# Patient Record
Sex: MALE | Race: WHITE | NOT HISPANIC OR LATINO
[De-identification: names, ages, dates, MRNs, and addresses within clinical notes are randomized per-mention and may not be internally consistent; named-entity substitution may affect disease eponyms.]

---

## 2017-04-17 ENCOUNTER — APPOINTMENT (OUTPATIENT)
Dept: SURGERY | Facility: CLINIC | Age: 62
End: 2017-04-17
Payer: MEDICARE

## 2017-04-17 VITALS
OXYGEN SATURATION: 99 % | RESPIRATION RATE: 15 BRPM | TEMPERATURE: 97.6 F | WEIGHT: 150 LBS | DIASTOLIC BLOOD PRESSURE: 91 MMHG | BODY MASS INDEX: 23.54 KG/M2 | SYSTOLIC BLOOD PRESSURE: 138 MMHG | HEART RATE: 61 BPM | HEIGHT: 67 IN

## 2017-04-17 DIAGNOSIS — R10.84 GENERALIZED ABDOMINAL PAIN: ICD-10-CM

## 2017-04-17 DIAGNOSIS — K56.1 INTUSSUSCEPTION: ICD-10-CM

## 2017-04-17 DIAGNOSIS — R10.12 LEFT UPPER QUADRANT PAIN: ICD-10-CM

## 2017-04-17 DIAGNOSIS — Z87.39 PERSONAL HISTORY OF OTHER DISEASES OF THE MUSCULOSKELETAL SYSTEM AND CONNECTIVE TISSUE: ICD-10-CM

## 2017-04-17 PROCEDURE — 99204 OFFICE O/P NEW MOD 45 MIN: CPT

## 2017-04-17 RX ORDER — CIPROFLOXACIN HYDROCHLORIDE 500 MG/1
500 TABLET, FILM COATED ORAL
Qty: 20 | Refills: 0 | Status: DISCONTINUED | COMMUNITY
Start: 2017-03-31

## 2017-04-17 RX ORDER — SUCRALFATE 1 G/10ML
1 SUSPENSION ORAL
Qty: 300 | Refills: 0 | Status: DISCONTINUED | COMMUNITY
Start: 2016-11-01

## 2017-04-17 RX ORDER — MESALAMINE 500 MG/1
500 CAPSULE ORAL
Qty: 240 | Refills: 0 | Status: DISCONTINUED | COMMUNITY
Start: 2017-03-21

## 2017-04-17 RX ORDER — OMEPRAZOLE 20 MG/1
20 CAPSULE, DELAYED RELEASE ORAL
Qty: 90 | Refills: 0 | Status: DISCONTINUED | COMMUNITY
Start: 2017-01-25

## 2017-04-17 RX ORDER — ONDANSETRON 8 MG/1
8 TABLET, ORALLY DISINTEGRATING ORAL
Qty: 20 | Refills: 0 | Status: DISCONTINUED | COMMUNITY
Start: 2016-12-02

## 2017-04-17 RX ORDER — ATORVASTATIN CALCIUM 10 MG/1
10 TABLET, FILM COATED ORAL
Qty: 30 | Refills: 0 | Status: DISCONTINUED | COMMUNITY
Start: 2017-02-13 | End: 2017-04-17

## 2018-10-30 ENCOUNTER — APPOINTMENT (OUTPATIENT)
Dept: SPINE | Facility: CLINIC | Age: 63
End: 2018-10-30
Payer: MEDICARE

## 2018-10-30 VITALS
DIASTOLIC BLOOD PRESSURE: 87 MMHG | HEIGHT: 67 IN | BODY MASS INDEX: 23.54 KG/M2 | HEART RATE: 49 BPM | SYSTOLIC BLOOD PRESSURE: 144 MMHG | WEIGHT: 150 LBS

## 2018-10-30 DIAGNOSIS — M50.20 OTHER CERVICAL DISC DISPLACEMENT, UNSPECIFIED CERVICAL REGION: ICD-10-CM

## 2018-10-30 DIAGNOSIS — Z56.0 UNEMPLOYMENT, UNSPECIFIED: ICD-10-CM

## 2018-10-30 PROCEDURE — 99203 OFFICE O/P NEW LOW 30 MIN: CPT

## 2018-10-30 SDOH — ECONOMIC STABILITY - INCOME SECURITY: UNEMPLOYMENT, UNSPECIFIED: Z56.0

## 2018-10-31 ENCOUNTER — APPOINTMENT (OUTPATIENT)
Dept: RADIOLOGY | Facility: CLINIC | Age: 63
End: 2018-10-31

## 2018-10-31 ENCOUNTER — OUTPATIENT (OUTPATIENT)
Dept: OUTPATIENT SERVICES | Facility: HOSPITAL | Age: 63
LOS: 1 days | End: 2018-10-31
Payer: MEDICARE

## 2018-10-31 ENCOUNTER — APPOINTMENT (OUTPATIENT)
Dept: CT IMAGING | Facility: CLINIC | Age: 63
End: 2018-10-31

## 2018-10-31 DIAGNOSIS — M54.12 RADICULOPATHY, CERVICAL REGION: ICD-10-CM

## 2018-10-31 PROCEDURE — 72125 CT NECK SPINE W/O DYE: CPT

## 2018-10-31 PROCEDURE — 76376 3D RENDER W/INTRP POSTPROCES: CPT | Mod: 26

## 2018-10-31 PROCEDURE — 76376 3D RENDER W/INTRP POSTPROCES: CPT

## 2018-10-31 PROCEDURE — 72082 X-RAY EXAM ENTIRE SPI 2/3 VW: CPT

## 2018-10-31 PROCEDURE — 72125 CT NECK SPINE W/O DYE: CPT | Mod: 26

## 2018-10-31 PROCEDURE — 72082 X-RAY EXAM ENTIRE SPI 2/3 VW: CPT | Mod: 26

## 2018-11-14 ENCOUNTER — APPOINTMENT (OUTPATIENT)
Dept: SPINE | Facility: CLINIC | Age: 63
End: 2018-11-14
Payer: MEDICARE

## 2018-11-14 VITALS
WEIGHT: 150 LBS | SYSTOLIC BLOOD PRESSURE: 110 MMHG | DIASTOLIC BLOOD PRESSURE: 78 MMHG | BODY MASS INDEX: 23.54 KG/M2 | HEIGHT: 67 IN

## 2018-11-14 PROCEDURE — 99212 OFFICE O/P EST SF 10 MIN: CPT

## 2018-11-16 ENCOUNTER — OUTPATIENT (OUTPATIENT)
Dept: OUTPATIENT SERVICES | Facility: HOSPITAL | Age: 63
LOS: 1 days | End: 2018-11-16
Payer: MEDICARE

## 2018-11-16 ENCOUNTER — APPOINTMENT (OUTPATIENT)
Dept: MRI IMAGING | Facility: CLINIC | Age: 63
End: 2018-11-16
Payer: MEDICARE

## 2018-11-16 DIAGNOSIS — Z00.8 ENCOUNTER FOR OTHER GENERAL EXAMINATION: ICD-10-CM

## 2018-11-16 PROCEDURE — 72141 MRI NECK SPINE W/O DYE: CPT | Mod: 26

## 2018-11-16 PROCEDURE — 72141 MRI NECK SPINE W/O DYE: CPT

## 2018-11-27 ENCOUNTER — APPOINTMENT (OUTPATIENT)
Dept: SPINE | Facility: CLINIC | Age: 63
End: 2018-11-27
Payer: MEDICARE

## 2018-11-27 VITALS
SYSTOLIC BLOOD PRESSURE: 146 MMHG | HEART RATE: 46 BPM | DIASTOLIC BLOOD PRESSURE: 85 MMHG | BODY MASS INDEX: 23.54 KG/M2 | WEIGHT: 150 LBS | HEIGHT: 67 IN

## 2018-11-27 DIAGNOSIS — Z78.9 OTHER SPECIFIED HEALTH STATUS: ICD-10-CM

## 2018-11-27 DIAGNOSIS — M54.12 RADICULOPATHY, CERVICAL REGION: ICD-10-CM

## 2018-11-27 PROCEDURE — 99214 OFFICE O/P EST MOD 30 MIN: CPT

## 2018-11-30 PROBLEM — Z78.9 SOCIAL ALCOHOL USE: Status: ACTIVE | Noted: 2018-10-30

## 2019-06-10 ENCOUNTER — TRANSCRIPTION ENCOUNTER (OUTPATIENT)
Age: 64
End: 2019-06-10

## 2019-07-09 ENCOUNTER — NON-APPOINTMENT (OUTPATIENT)
Age: 64
End: 2019-07-09

## 2019-07-09 ENCOUNTER — APPOINTMENT (OUTPATIENT)
Dept: CARDIOLOGY | Facility: CLINIC | Age: 64
End: 2019-07-09
Payer: MEDICARE

## 2019-07-09 VITALS
HEIGHT: 67 IN | OXYGEN SATURATION: 97 % | SYSTOLIC BLOOD PRESSURE: 120 MMHG | WEIGHT: 146 LBS | DIASTOLIC BLOOD PRESSURE: 76 MMHG | BODY MASS INDEX: 22.91 KG/M2 | HEART RATE: 55 BPM

## 2019-07-09 DIAGNOSIS — I49.1 ATRIAL PREMATURE DEPOLARIZATION: ICD-10-CM

## 2019-07-09 PROCEDURE — 99204 OFFICE O/P NEW MOD 45 MIN: CPT | Mod: 25

## 2019-07-09 PROCEDURE — 93000 ELECTROCARDIOGRAM COMPLETE: CPT

## 2019-07-09 RX ORDER — ATORVASTATIN CALCIUM 80 MG/1
TABLET, FILM COATED ORAL
Refills: 0 | Status: ACTIVE | COMMUNITY

## 2019-07-09 RX ORDER — OMEPRAZOLE 20 MG/1
20 TABLET, DELAYED RELEASE ORAL
Refills: 0 | Status: ACTIVE | COMMUNITY

## 2019-07-09 NOTE — REVIEW OF SYSTEMS
[Negative] : Heme/Lymph [Recent Weight Loss (___ Lbs)] : recent [unfilled] ~Ulb weight loss [see HPI] : see HPI [Abdominal Pain] : abdominal pain [FreeTextEntry3] : hay fever [Under Stress] : under stress [FreeTextEntry2] : back pain

## 2019-07-09 NOTE — HISTORY OF PRESENT ILLNESS
[FreeTextEntry1] : Kenny Sánchez is a 63-year-old man with a history of Slime-Parkinson-White status post remote ablation, Mccoy's esophagus, premature atrial complexes, and cervical spine disease  who returns for cardiac examination after a long absence - our last encounter was more than 3 years ago.  He has been doing well from a cardiac standpoint but was recently hospitalized at [facility] (mid June 2019) with appendicitis complicated by rupture and treated with a course of antibiotics with plans for eventual operative intervention.  He has been taking Avelox and is concerned about potential side effects -- specifically, tendon rupture and risk of aneurysm formation.  He recently bent over a railing on his deck resulting in exacerbation of his abdominal discomfort -- he is worried about "injuring" his abdominal aorta and requests imaging.  He denies angina, dyspnea, palpitations; baseline functional status is good.\par

## 2019-07-09 NOTE — DISCUSSION/SUMMARY
[With Me] : with me [FreeTextEntry1] : \par Abdominal pain: Likely related to recent appendicitis with rupture and reported small abscess formation; injury to the abdominal aorta seems unlikely -- patient will return for ultrasound imaging of the abdominal aorta.\par \par History of Slime-Parkinson-White syndrome: Status post ablation; no recent arrhythmia\par \par Premature atrial complexes: Chronic; no treatment indicated at the present time. [___ Year(s)] : [unfilled] year(s)

## 2019-07-09 NOTE — PHYSICAL EXAM
[Normal Appearance] : normal appearance [General Appearance - In No Acute Distress] : no acute distress [Well Groomed] : well groomed [Eyelids - No Xanthelasma] : the eyelids demonstrated no xanthelasmas [No Oral Cyanosis] : no oral cyanosis [No Oral Pallor] : no oral pallor [Heart Rate And Rhythm] : heart rate and rhythm were normal [Murmurs] : no murmurs present [Heart Sounds] : normal S1 and S2 [Respiration, Rhythm And Depth] : normal respiratory rhythm and effort [Bowel Sounds] : normal bowel sounds [Auscultation Breath Sounds / Voice Sounds] : lungs were clear to auscultation bilaterally [Abdomen Tenderness] : non-tender [Abdomen Soft] : soft [Abnormal Walk] : normal gait [Nail Clubbing] : no clubbing of the fingernails [Cyanosis, Localized] : no localized cyanosis [Skin Color & Pigmentation] : normal skin color and pigmentation [Oriented To Time, Place, And Person] : oriented to person, place, and time [] : no rash [Impaired Insight] : insight and judgment were intact [Affect] : the affect was normal [Mood] : the mood was normal [Abdomen Mass (___ Cm)] : no abdominal mass palpated

## 2019-07-19 ENCOUNTER — APPOINTMENT (OUTPATIENT)
Dept: CARDIOLOGY | Facility: CLINIC | Age: 64
End: 2019-07-19
Payer: MEDICARE

## 2019-07-19 PROCEDURE — 93978 VASCULAR STUDY: CPT

## 2019-07-24 ENCOUNTER — APPOINTMENT (OUTPATIENT)
Dept: INTERNAL MEDICINE | Facility: CLINIC | Age: 64
End: 2019-07-24
Payer: MEDICARE

## 2019-07-24 VITALS
BODY MASS INDEX: 22.76 KG/M2 | SYSTOLIC BLOOD PRESSURE: 124 MMHG | DIASTOLIC BLOOD PRESSURE: 72 MMHG | WEIGHT: 145 LBS | HEIGHT: 67 IN | RESPIRATION RATE: 18 BRPM | TEMPERATURE: 97.9 F | OXYGEN SATURATION: 97 % | HEART RATE: 65 BPM

## 2019-07-24 DIAGNOSIS — R50.9 FEVER, UNSPECIFIED: ICD-10-CM

## 2019-07-24 DIAGNOSIS — R10.9 UNSPECIFIED ABDOMINAL PAIN: ICD-10-CM

## 2019-07-24 PROCEDURE — 99204 OFFICE O/P NEW MOD 45 MIN: CPT

## 2019-07-24 NOTE — HISTORY OF PRESENT ILLNESS
[FreeTextEntry8] : abd pain.\par \par This is a 63-year-old man was admitted to [facility] in June for appendicitis with rupture. He has been treated with Avelox and took his last dose yesterday. He comes in today with a four-day history of fever up to 102.7, 102 last night, and epigastric persistent stabbing pain radiating out laterally, bilaterally. He is having nausea and a very decreased appetite. No vomiting. No diarrhea or blood per rectum.\par \par He is not having cough or sputum production.

## 2019-07-24 NOTE — ASSESSMENT
[FreeTextEntry1] : #1 4 day history of fever, up to 102.7 and persistent stabbing epigastric pain. Patient was sent directly to the emergency room. His following surgeons Dr. Francsi and Dr Butterfield are at [place] and he will go there.  I s/w ER there.\par \par #2 history of appendicitis with rupture in mid June, 2019.\par \par #3 history of WPW, status post ablation.\par \par #4 hyperlipidemia.\par \par #5 history of Mccoy's esophagus.

## 2019-07-24 NOTE — PHYSICAL EXAM
[No Acute Distress] : no acute distress [Well Nourished] : well nourished [Well Developed] : well developed [Normal Sclera/Conjunctiva] : normal sclera/conjunctiva [Normal Outer Ear/Nose] : the outer ears and nose were normal in appearance [Normal Oropharynx] : the oropharynx was normal [No JVD] : no jugular venous distention [No Lymphadenopathy] : no lymphadenopathy [Supple] : supple [No Respiratory Distress] : no respiratory distress  [No Accessory Muscle Use] : no accessory muscle use [Clear to Auscultation] : lungs were clear to auscultation bilaterally [Normal Rate] : normal rate  [Normal S1, S2] : normal S1 and S2 [No Edema] : there was no peripheral edema [No Extremity Clubbing/Cyanosis] : no extremity clubbing/cyanosis [Soft] : abdomen soft [Non-distended] : non-distended [No HSM] : no HSM [Normal Bowel Sounds] : normal bowel sounds [Normal Anterior Cervical Nodes] : no anterior cervical lymphadenopathy [No Rash] : no rash [Normal Gait] : normal gait [No Focal Deficits] : no focal deficits [Normal Affect] : the affect was normal [Normal Insight/Judgement] : insight and judgment were intact [de-identified] : tender to deep palp epig, LUQ and RLQ [de-identified] : HR 64

## 2019-07-24 NOTE — REVIEW OF SYSTEMS
[Fever] : fever [Fatigue] : fatigue [Chills] : chills [Cough] : no cough [FreeTextEntry7] : see above

## 2019-08-13 ENCOUNTER — APPOINTMENT (OUTPATIENT)
Dept: COLORECTAL SURGERY | Facility: CLINIC | Age: 64
End: 2019-08-13
Payer: MEDICARE

## 2019-08-13 VITALS
BODY MASS INDEX: 22.76 KG/M2 | HEART RATE: 72 BPM | DIASTOLIC BLOOD PRESSURE: 86 MMHG | WEIGHT: 145 LBS | TEMPERATURE: 98.1 F | HEIGHT: 67 IN | SYSTOLIC BLOOD PRESSURE: 129 MMHG

## 2019-08-13 DIAGNOSIS — K35.32 ACUTE APPENDICITIS W/ PERFORATION AND LOCALIZED PERITONITIS, W/O ABSCESS: ICD-10-CM

## 2019-08-13 PROCEDURE — 99215 OFFICE O/P EST HI 40 MIN: CPT

## 2019-08-13 NOTE — ASSESSMENT
[FreeTextEntry1] : 63-year-old male with a recent laparoscopic appendix removal possibility of spilled mucinous material. Recommend consultation with surgical oncology for possibility of HIPCT

## 2019-08-13 NOTE — PHYSICAL EXAM
[Abdomen Masses] : No abdominal masses [Abdomen Tenderness] : ~T No ~M abdominal tenderness [Tender] : nontender [JVD] : no jugular venous distention  [Normal Breath Sounds] : Normal breath sounds [Normal Heart Sounds] : normal heart sounds [Normal Rate and Rhythm] : normal rate and rhythm [No Rash or Lesion] : No rash or lesion [Alert] : alert [Oriented to Person] : oriented to person [Oriented to Place] : oriented to place [Oriented to Time] : oriented to time [Calm] : calm [de-identified] : Looks well in no distress, of stated age. [de-identified] : pupils equal reactive to light normocephalic atraumatic. [de-identified] : moves all 4 extremities appropriately with 5 over 5 strength

## 2019-08-13 NOTE — HISTORY OF PRESENT ILLNESS
[FreeTextEntry1] : 63-year-old male recently underwent laparoscopic resection of perforated appendicitis. Was told that there was mucinous lesion spilled during the operation and possibility of future recurrence

## 2019-08-21 ENCOUNTER — APPOINTMENT (OUTPATIENT)
Dept: INTERNAL MEDICINE | Facility: CLINIC | Age: 64
End: 2019-08-21

## 2020-03-12 ENCOUNTER — APPOINTMENT (OUTPATIENT)
Dept: DERMATOLOGY | Facility: CLINIC | Age: 65
End: 2020-03-12
Payer: MEDICARE

## 2020-03-12 ENCOUNTER — APPOINTMENT (OUTPATIENT)
Dept: DERMATOLOGY | Facility: CLINIC | Age: 65
End: 2020-03-12
Payer: SELF-PAY

## 2020-03-12 DIAGNOSIS — Z00.00 ENCOUNTER FOR GENERAL ADULT MEDICAL EXAMINATION W/OUT ABNORMAL FINDINGS: ICD-10-CM

## 2020-03-12 DIAGNOSIS — L21.9 SEBORRHEIC DERMATITIS, UNSPECIFIED: ICD-10-CM

## 2020-03-12 PROCEDURE — 99203 OFFICE O/P NEW LOW 30 MIN: CPT | Mod: 25

## 2020-03-12 PROCEDURE — 17000 DESTRUCT PREMALG LESION: CPT | Mod: 59

## 2020-03-12 PROCEDURE — 0029D: CPT

## 2020-03-12 PROCEDURE — 17110 DESTRUCTION B9 LES UP TO 14: CPT

## 2020-03-12 PROCEDURE — 11102 TANGNTL BX SKIN SINGLE LES: CPT | Mod: 59

## 2020-03-12 NOTE — HISTORY OF PRESENT ILLNESS
[FreeTextEntry1] : Patient presents for skin examination. [de-identified] : Notes itching and irritated lesion of the left flank.  Present for many months, exacerbating/remitting, especially after biking in tight biking shirt.

## 2020-03-12 NOTE — PHYSICAL EXAM
[Alert] : alert [Oriented x 3] : ~L oriented x 3 [Well Nourished] : well nourished [Full Body Skin Exam Performed] : performed [FreeTextEntry3] : A full skin exam was performed including the scalp, face (including lips, ears, nose and eyes), neck, chest, abdomen, back, buttocks, upper extremities and lower extremities.  The patient declined examination of the genitalia.  \par The exam revealed the following benign growths:\par [place] pigmented nevi.\par Seborrheic keratoses.\par Lentigines.\par \par Keratotic papule, right forehead.\par \par Greasy tan papules with erythema, right pre-auricular region and left flank.\par \par Mild erythema and scalp, bilateral suprabrows.\par \par Hyperpigmented macule with hyperpigmented speck at the right lateral pole, on the xyphoid process.

## 2020-03-12 NOTE — ASSESSMENT
[FreeTextEntry1] : A complete skin examination was performed.  We discussed the importance of photoprotection, including the use of hats, protective clothing and sunscreens with an SPF of at least 30.  Sun avoidance was also discussed.  The ABCDE's of melanoma was discussed with the patient.  Regular skin exams are encouraged.\par \par Seb derm\par hytone OTC bid prn.\par \par R/O melanoma of the xyphoid process.\par Will call patient with bx results.

## 2020-03-23 LAB — CORE LAB BIOPSY: NORMAL

## 2020-06-20 ENCOUNTER — APPOINTMENT (OUTPATIENT)
Dept: DERMATOLOGY | Facility: CLINIC | Age: 65
End: 2020-06-20
Payer: MEDICARE

## 2020-06-20 VITALS — WEIGHT: 150 LBS | BODY MASS INDEX: 23.49 KG/M2

## 2020-06-20 DIAGNOSIS — L85.3 XEROSIS CUTIS: ICD-10-CM

## 2020-06-20 DIAGNOSIS — D48.5 NEOPLASM OF UNCERTAIN BEHAVIOR OF SKIN: ICD-10-CM

## 2020-06-20 DIAGNOSIS — L82.1 OTHER SEBORRHEIC KERATOSIS: ICD-10-CM

## 2020-06-20 PROCEDURE — 99213 OFFICE O/P EST LOW 20 MIN: CPT | Mod: 25

## 2020-06-20 PROCEDURE — 11102 TANGNTL BX SKIN SINGLE LES: CPT | Mod: 59

## 2020-06-20 PROCEDURE — 17110 DESTRUCTION B9 LES UP TO 14: CPT | Mod: 59

## 2020-06-26 LAB — CORE LAB BIOPSY: NORMAL

## 2020-08-18 ENCOUNTER — APPOINTMENT (OUTPATIENT)
Dept: DERMATOLOGY | Facility: CLINIC | Age: 65
End: 2020-08-18
Payer: MEDICARE

## 2020-08-18 VITALS — HEIGHT: 67 IN | WEIGHT: 150 LBS | BODY MASS INDEX: 23.54 KG/M2

## 2020-08-18 DIAGNOSIS — L60.3 NAIL DYSTROPHY: ICD-10-CM

## 2020-08-18 PROCEDURE — 99213 OFFICE O/P EST LOW 20 MIN: CPT

## 2021-09-13 ENCOUNTER — NON-APPOINTMENT (OUTPATIENT)
Age: 66
End: 2021-09-13

## 2021-09-14 ENCOUNTER — APPOINTMENT (OUTPATIENT)
Dept: COLORECTAL SURGERY | Facility: CLINIC | Age: 66
End: 2021-09-14
Payer: MEDICARE

## 2021-09-14 PROCEDURE — 99214 OFFICE O/P EST MOD 30 MIN: CPT | Mod: 25

## 2021-09-14 PROCEDURE — 46221 LIGATION OF HEMORRHOID(S): CPT

## 2021-09-15 VITALS
RESPIRATION RATE: 16 BRPM | DIASTOLIC BLOOD PRESSURE: 80 MMHG | HEART RATE: 54 BPM | BODY MASS INDEX: 23.54 KG/M2 | WEIGHT: 150 LBS | TEMPERATURE: 97 F | HEIGHT: 67 IN | SYSTOLIC BLOOD PRESSURE: 135 MMHG

## 2021-09-20 NOTE — PHYSICAL EXAM
[Abdomen Masses] : No abdominal masses [Abdomen Tenderness] : ~T No ~M abdominal tenderness [Tender] : nontender [Manually Reducible] : a manually reducible (grade III) [Tender, Swollen] : tender, swollen [JVD] : no jugular venous distention  [Normal Breath Sounds] : Normal breath sounds [Normal Heart Sounds] : normal heart sounds [No Rash or Lesion] : No rash or lesion [Normal Rate and Rhythm] : normal rate and rhythm [Alert] : alert [Oriented to Person] : oriented to person [Oriented to Place] : oriented to place [Oriented to Time] : oriented to time [de-identified] : Looks well in no distress, of stated age. [Calm] : calm [de-identified] : pupils equal reactive to light normocephalic atraumatic. [de-identified] : moves all 4 extremities appropriately with 5 over 5 strength

## 2021-09-20 NOTE — HISTORY OF PRESENT ILLNESS
[FreeTextEntry1] : 65-year-old male recently underwent laparoscopic resection of perforated appendicitis. Was told that there was mucinous lesion spilled during the operation and possibility of future recurrence. also complains of prolapsing and bleeding hemorrhoids

## 2021-09-20 NOTE — ASSESSMENT
[FreeTextEntry1] : 65-year-old male with a past laparoscopic appendix removal possibility of spilled mucinous material. Recommend consultation with surgical oncology for possibility of HIPCT, also complains of prolapsing and bleeding hemorrhoids

## 2021-09-20 NOTE — REVIEW OF SYSTEMS
[As Noted in HPI] : as noted in HPI [Negative] : Heme/Lymph [FreeTextEntry7] : prolapsing and bleeding hemorrhoids

## 2021-11-09 ENCOUNTER — APPOINTMENT (OUTPATIENT)
Dept: DERMATOLOGY | Facility: CLINIC | Age: 66
End: 2021-11-09
Payer: MEDICARE

## 2021-11-09 DIAGNOSIS — Z12.83 ENCOUNTER FOR SCREENING FOR MALIGNANT NEOPLASM OF SKIN: ICD-10-CM

## 2021-11-09 DIAGNOSIS — L82.0 INFLAMED SEBORRHEIC KERATOSIS: ICD-10-CM

## 2021-11-09 DIAGNOSIS — L57.0 ACTINIC KERATOSIS: ICD-10-CM

## 2021-11-09 PROCEDURE — 17000 DESTRUCT PREMALG LESION: CPT | Mod: 59

## 2021-11-09 PROCEDURE — 17110 DESTRUCTION B9 LES UP TO 14: CPT | Mod: 59

## 2021-11-09 PROCEDURE — 99213 OFFICE O/P EST LOW 20 MIN: CPT | Mod: 25

## 2021-11-09 NOTE — PHYSICAL EXAM
[FreeTextEntry3] : AAOx3, pleasant, NAD, no visual lymphadenopathy\par hair, scalp, face, nose, eyelids, ears, lips, oropharynx, neck, chest, abdomen, back, right arm, left arm, nails, and hands examined with all normal findings,\par pertinent findings include:\par \par multiple benign nevi and lentigines\par scaling erythematous papule on right cheek\par + inflamed, waxy, keratotic papule on b/l temples

## 2021-11-09 NOTE — HISTORY OF PRESENT ILLNESS
[FreeTextEntry1] : skin check [de-identified] : 66 year old male here for skin check. itchy spots on b/l temples and scaly spot on right cheek.

## 2021-11-09 NOTE — ASSESSMENT
[FreeTextEntry1] : 1) benign findings as above- education\par \par 2) Actinic keratoses as above\par -Treated with cryotherapy x 2, side effects discussed including erythema and scarring, blister formation expected by patient, total freeze time 4 seconds. Wound care reviewed withpatient. Risk of hypo/hyperpigmentation reviewed with patient, and possible need for re-treatment and / or future biopsy also reviewed with patient\par - total # treated- 1\par \par 3) ISK\par The specified lesions were treated with liquid nitrogen cryotherapy.  Discussed risks including pain, blistering, crusting, discoloration, recurrence.\par

## 2022-01-11 ENCOUNTER — APPOINTMENT (OUTPATIENT)
Dept: COLORECTAL SURGERY | Facility: CLINIC | Age: 67
End: 2022-01-11
Payer: MEDICARE

## 2022-01-11 VITALS
BODY MASS INDEX: 23.54 KG/M2 | HEIGHT: 67 IN | HEART RATE: 58 BPM | WEIGHT: 150 LBS | TEMPERATURE: 96.2 F | RESPIRATION RATE: 16 BRPM | DIASTOLIC BLOOD PRESSURE: 82 MMHG | SYSTOLIC BLOOD PRESSURE: 128 MMHG

## 2022-01-11 DIAGNOSIS — K64.8 OTHER HEMORRHOIDS: ICD-10-CM

## 2022-01-11 PROCEDURE — 46221 LIGATION OF HEMORRHOID(S): CPT

## 2022-01-11 PROCEDURE — 99214 OFFICE O/P EST MOD 30 MIN: CPT | Mod: 25

## 2022-01-11 NOTE — HISTORY OF PRESENT ILLNESS
[FreeTextEntry1] : 66-year-old male recently underwent laparoscopic resection of perforated appendicitis. Was told that there was mucinous lesion spilled during the operation and possibility of future recurrence. also complains of prolapsing and bleeding hemorrhoids

## 2022-01-11 NOTE — PHYSICAL EXAM
[Abdomen Masses] : No abdominal masses [Abdomen Tenderness] : ~T No ~M abdominal tenderness [Tender] : nontender [Manually Reducible] : a manually reducible (grade III) [Tender, Swollen] : tender, swollen [JVD] : no jugular venous distention  [Normal Breath Sounds] : Normal breath sounds [Normal Heart Sounds] : normal heart sounds [Normal Rate and Rhythm] : normal rate and rhythm [No Rash or Lesion] : No rash or lesion [Alert] : alert [Oriented to Person] : oriented to person [Oriented to Place] : oriented to place [Oriented to Time] : oriented to time [Calm] : calm [de-identified] : Looks well in no distress, of stated age. [de-identified] : pupils equal reactive to light normocephalic atraumatic. [de-identified] : moves all 4 extremities appropriately with 5 over 5 strength

## 2022-01-11 NOTE — ASSESSMENT
[FreeTextEntry1] : 66-year-old male with a past laparoscopic appendix removal possibility of spilled mucinous material. Recommend consultation with surgical oncology for possibility of HIPCT, also complains of prolapsing and bleeding hemorrhoids

## 2022-02-28 ENCOUNTER — APPOINTMENT (OUTPATIENT)
Dept: PULMONOLOGY | Facility: CLINIC | Age: 67
End: 2022-02-28
Payer: MEDICARE

## 2022-02-28 ENCOUNTER — NON-APPOINTMENT (OUTPATIENT)
Age: 67
End: 2022-02-28

## 2022-02-28 VITALS
SYSTOLIC BLOOD PRESSURE: 110 MMHG | HEIGHT: 67 IN | BODY MASS INDEX: 23.86 KG/M2 | RESPIRATION RATE: 16 BRPM | WEIGHT: 152 LBS | TEMPERATURE: 97.3 F | HEART RATE: 52 BPM | OXYGEN SATURATION: 99 % | DIASTOLIC BLOOD PRESSURE: 70 MMHG

## 2022-02-28 DIAGNOSIS — Z87.891 PERSONAL HISTORY OF NICOTINE DEPENDENCE: ICD-10-CM

## 2022-02-28 PROCEDURE — 99204 OFFICE O/P NEW MOD 45 MIN: CPT | Mod: 25

## 2022-02-28 PROCEDURE — 94729 DIFFUSING CAPACITY: CPT

## 2022-02-28 PROCEDURE — 94618 PULMONARY STRESS TESTING: CPT

## 2022-02-28 PROCEDURE — 71046 X-RAY EXAM CHEST 2 VIEWS: CPT | Mod: 26

## 2022-02-28 PROCEDURE — 94010 BREATHING CAPACITY TEST: CPT

## 2022-02-28 PROCEDURE — 94727 GAS DIL/WSHOT DETER LNG VOL: CPT

## 2022-02-28 NOTE — HISTORY OF PRESENT ILLNESS
[Former] : former [< 30 pack-years] : < 30 pack-years [TextBox_4] : Mr. Sánchez is a 66 year old, former remote smoking, male.  He has past medical history of Seasonal allergies (Hay fever), Slime-Parkinson-White status post remote ablation, LBBB (transitional/rate related), Mccoy's esophagus, premature atrial complexes, and cervical spine disease.  He presents for an initial visit.\par \par His chief concern is abrupt chest tightness, palpitations, & slight lightheadedness since 2/7/22. \par \par He states he took a spin class that day, but that is part of his normal exercise routine.  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  He does not recall working out harder than usual on this day.  He states that symptoms of palpitations, cold sweats, chest tightness, SOB with minimal exertion, and slight lightheadedness were at their worst for about 1 day.  He states since then some symptoms have resolved completely (cold sweats) and all of the other ones have improved.  He admits to slightly increased fatigue where he may need a 20 minute nap during the day, which only started after 2/7/22.\par \par He notes he is unsure if this is cardiac in nature.  He states he will be seeing his cardiologist tomorrow, Dr. Walden, for evaluation. He notes he feels LBBB may be causing symptoms. He states bad reaction to Shingrex vaccination in April 2021, which resulted in SVT. He was evaluated by his EP MD, Dr. Norton, of [facility].  He had loop recorded placed which has not shown SVT since this episode. \par \par He states his baseline SPO2 at home is 95%. \par Patient notes he is s/p J&J for initial Covid-19 vaccination and Moderna from 11/2021. \par \par He denies fevers, decreased appetite, wheezing, cough, or any other issues at this time.  [TextBox_11] : 0.5 [TextBox_13] : 5 [YearQuit] : 1980

## 2022-02-28 NOTE — ASSESSMENT
[FreeTextEntry1] : Mr. Sánchez is a 66 year old, former remote smoking, male.  He has past medical history of Seasonal allergies (Hay fever), Slime-Parkinson-White status post remote ablation, LBBB (transitional/rate related), Mccoy's esophagus, premature atrial complexes, and cervical spine disease.  He presents for an initial visit.  His chief concern is abrupt chest tightness, palpitations, & slight lightheadedness since 2/7/22. \par \par 1. SOB on Exertion:\par - likely r/t cardiac hx: Patient to see Cardiologist, Dr. Walden, tomorrow for evaluation. \par - SPI, 6MWT, CXR WNL.\par - RX for D-Dimer, CBC, and Iron Studies RX'ed.\par \par Patient to follow up in office PFTs. \par Patient to call with further questions and concerns.\par Patient verbalizes understanding of care plan and is agreeable.\par

## 2022-02-28 NOTE — PROCEDURE
[FreeTextEntry1] : SPI performed in office WNL. \par FEV1: 112% \par FEV1/FVC Ratio: 103% \par JQE17-71%: 121% \par \par 6MWT Performed in office WNL. \par RA SPO2 @ REST: 98% \par RA SPO2 @ EXERTION: 92-97% \par \par CXR in office; Read by Dr. Gurrola. \par Impression: Normal appearing heart. Loop recorder present. No infiltrates, effusion or opacities noted.\par \par \par \par

## 2022-02-28 NOTE — REVIEW OF SYSTEMS
[Fatigue] : fatigue [Chest Tightness] : chest tightness [SOB on Exertion] : sob on exertion [Palpitations] : palpitations [Fever] : no fever [Chills] : no chills [Poor Appetite] : no poor appetite [Cough] : no cough [Sputum] : no sputum [Dyspnea] : no dyspnea [Wheezing] : no wheezing

## 2022-04-05 ENCOUNTER — APPOINTMENT (OUTPATIENT)
Dept: PULMONOLOGY | Facility: CLINIC | Age: 67
End: 2022-04-05
Payer: MEDICARE

## 2022-04-05 VITALS
OXYGEN SATURATION: 99 % | BODY MASS INDEX: 23.23 KG/M2 | RESPIRATION RATE: 16 BRPM | DIASTOLIC BLOOD PRESSURE: 80 MMHG | HEIGHT: 67 IN | SYSTOLIC BLOOD PRESSURE: 130 MMHG | HEART RATE: 65 BPM | TEMPERATURE: 96.7 F | WEIGHT: 148 LBS

## 2022-04-05 PROCEDURE — ZZZZZ: CPT

## 2022-04-05 PROCEDURE — 94727 GAS DIL/WSHOT DETER LNG VOL: CPT

## 2022-04-05 PROCEDURE — 99214 OFFICE O/P EST MOD 30 MIN: CPT | Mod: 25

## 2022-04-05 PROCEDURE — 94010 BREATHING CAPACITY TEST: CPT

## 2022-04-05 PROCEDURE — 95012 NITRIC OXIDE EXP GAS DETER: CPT

## 2022-04-05 PROCEDURE — 94729 DIFFUSING CAPACITY: CPT

## 2022-04-05 RX ORDER — BUDESONIDE, GLYCOPYRROLATE, AND FORMOTEROL FUMARATE 160; 9; 4.8 UG/1; UG/1; UG/1
160-9-4.8 AEROSOL, METERED RESPIRATORY (INHALATION)
Qty: 1 | Refills: 5 | Status: ACTIVE | COMMUNITY
Start: 2022-04-05 | End: 1900-01-01

## 2022-04-05 NOTE — PROCEDURE
[FreeTextEntry1] : Full PFT revealed normal flows, with a FEV1 of 3.40L,which is 118% of predicted,  normal lung volumes, and a diffusion of  19.5, which is 103% of predicted with a normal flow volume loop\par \par FENO was 30 ; a normal value being less than 25\par Fractional exhaled nitric oxide (FENO) is regarded as a simple, noninvasive method for assessing eosinophilic airway inflammation. Produced by a variety of cells within the lung, nitric oxide (NO) concentrations are generally low in healthy individuals. However, high concentrations of NO appear to be involved in nonspecific host defense mechanisms and chronic inflammatory diseases such as asthma. The American Thoracic Society (ATS) therefore has recommended using FENO to aid in the diagnosis and monitoring of eosinophilic airway inflammation and asthma, and for identifying steroid responsive individuals whose chronic respiratory symptoms may be caused by airway inflammation. \par \par Blood test (3/2022) WBC 5.7 hgb 15.4 hct 44.8 plt 456 eos 516 or 7.7%, D dimer .5, Iron studies WNL \par

## 2022-04-05 NOTE — REASON FOR VISIT
[Shortness of Breath] : shortness of breath [Follow-Up] : a follow-up visit [TextBox_44] : SOB, chest pressure/ tightness, poor sleep, asthma, Eosinophilic asthma, allergy, GERD, ?MARIA DEL ROSARIO

## 2022-04-05 NOTE — ADDENDUM
[FreeTextEntry1] : Documented by Donnie Lopez acting as a scribe for Dr. Gabriele Gurrola on 04/05/2022 \par \par All medical record entries made by the Scribe were at my, Dr. Gabriele Gurrola's, direction and personally dictated by me on 04/05/2022 . I have reviewed the chart and agree that the record accurately reflects my personal performance of the history, physical exam, assessment and plan. I have also personally directed, reviewed, and agree with the discharge instructions

## 2022-04-05 NOTE — HISTORY OF PRESENT ILLNESS
[Former] : former [TextBox_4] : Mr. MIGUEL  is a 66 year old male with a history of Seasonal allergies (Hay fever), Slime-Parkinson-White status post remote ablation, LBBB (transitional/rate related), Mccoy's esophagus, premature atrial complexes, and cervical spine disease. who now comes to the office for a follow up pulmonary evaluation. His chief complaint is\par \par He notes palpitations and arrhythmia 2/2022 that developed into chest pressure in left side of chest\par -he notes intermittent sharp pain in left chest \par -he notes intermittent bruised feeling in trachea \par -he notes globus sensation when taking deep breath onset 2/2022\par -he notes sleeping for about 5 hrs \par -he notes sleep is interrupted by nocturia \par -he notes mild night sweats \par -he notes snoring when tired\par -he notes persistent palpitations in the morning \par -he notes no longer exercising due to palpitations\par -he notes seeing Dr. Lozoya and arrhythmia specialist Dr. Medrano who performed an ablation that was unsuccessful \par -he notes loop recorder in place \par -he notes the palpitations was diagnoses as PAC \par -he notes severe reaction to shingles shot \par -he denies wheezing \par -he notes productive cough \par -he notes sinuses are quiet \par -he notes he usually sleeps in supine position \par -he denies restful sleep onset palpitations starting \par \par -he denies any headaches, nausea, vomiting, fever, chills, diarrhea, constipation, dysphagia, dizziness, leg swelling, leg pain, itchy eyes, itchy ears, heartburn, reflux, sour taste in the mouth, myalgias or arthralgias.  [TextBox_11] : 0.5 [TextBox_13] : 5 [YearQuit] : 1980

## 2022-04-05 NOTE — ASSESSMENT
[FreeTextEntry1] : Mr. MIGUEL  is a 66 year old male with a history of Seasonal allergies (Hay fever), Slime-Parkinson-White status post remote ablation, LBBB (transitional/rate related), Mccoy's esophagus, premature atrial complexes, and cervical spine disease. who now comes to the office for a follow up pulmonary evaluation for SOB, chest pressure/ tightness, poor sleep, asthma, Eosinophilic asthma, allergy, GERD, ?MARIA DEL ROSARIO\par \par His shortness of breath is multifactorial due to:\par -poor mechanics of breathing \par -out of shape / overweight\par -pulmonary disease\par    -\par -?cardiac disease (Dr. Lozoya and Dr Norton)\par \par problem 1. asthma \par -add Breztri 2 inhalations BID \par -Asthma is  believed to be caused by inherited (genetic) and environmental factor, but its exact cause is unknown. Asthma may be triggered by allergens, lung infections, or irritants in the air. Asthma triggers are different for each person \par -Inhaler technique reviewed as well as oral hygiene techniques reviewed with patient. Avoidance of cold air, extremes of temperature, rescue inhaler should be used before exercise. Order of medication reviewed with patient. Recommended use of a cool mist humidifier in the bedroom. 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 Compared to a placebo, patients with severe asthma receiving Nucala had fewer exacerbation requiring hospitalization and/or emergency department visits, and a longer time to first exacerbation.  In addition, patients with severe asthma receiving Nucala or Fasenra experienced greater reductions in their daily maintenance oral corticosteroid dose, while maintaining asthma control compared with patients receiving placebo. Treatment with Nucala did not result in a significant improvement in lung function, as measured by the volume of air exhaled by patients in one second. The most common side effects include: headache, injection site reactions, back pain, weakness, and fatigue; hypersensitivity reactions can occur within hours or days including swelling of the face, mouth, and tongue, fainting, dizziness, hives, breathing problems, and rash; herpes zoster infections have occurred. The drug is a monoclonal antibody that inhibits interleukin-5 which helps regular eosinophils, a type of white blood cell that contributes to asthma. The over-production of eosinophils can cause inflammation in the lungs, increasing the frequency of asthma attacks. Patients must also take other medications, including high dose inhaled corticosteroids and at least one additional asthma drug. \par \par problem 2:Allergies\par -no Rx at this time \par -get Blood work to include: asthma panel, food IgE panel, IgE level, eosinophil level, vitamin D level \par -Environmental measures for allergies were encouraged including mattress and pillow covers, air purifier, and environmental controls. \par \par problem 3:LPR/ GERD ?Eosinophilic esophagitis \par -Add Pepcid 40 mg QHS \par -continue Omeprazole 20 mg QAM, pre-meal \par -recommended f/u with Dr Wei\par -Rule of 2s: avoid eating too much, eating too late, eating too spicy, eating two hours before bed.\par -Things to avoid including overeating, spicy foods, tight clothing, eating within three hours of bed, this list is not all inclusive. \par -For treatment of reflux, possible options discussed including diet control, H2 blockers, PPIs, as well as coating motility agents discussed as treatment options. Timing of meals and proximity of last meal to sleep were discussed. If symptoms persist, a formal gastrointestinal evaluation is needed. \par \par Problem 4: poor sleep r/o MARIA DEL ROSARIO \par -complete home sleep study \par Sleep apnea is associated with adverse clinical consequences which can affect most organ systems.  Cardiovascular disease risk includes arrhythmias, atrial fibrillation, hypertension, coronary artery disease, and stroke. Metabolic disorders include diabetes type 2, non-alcoholic fatty liver disease. Mood disorder especially depression; and cognitive decline especially in the elderly. Associations with  chronic reflux/Mccoy’s esophagus some but not all inclusive. \par -Reasons  include arousal consistent with hypopnea; respiratory events most prominent in REM sleep or supine position; therefore sleep staging and body position are important for accurate diagnosis and estimation of AHI. \par \par problem 5: cardiac disease\par -recommended to continue to follow up with Cardiologist (Dr. Lozoya and Dr Norton)\par \par problem 6: poor breathing mechanics\par -recommended Wim Hof and Buteyko breathing techniques\par -recommended internet exercise platforms: exurbe cosmetics and Aerobic exercise for seniors\par -Proper breathing techniques were reviewed with an emphasis of exhalation. Patient instructed to breath in for 1 second and out for four seconds. Patient was encouraged to not talk while walking. \par \par problem 7: out of shape / overweight\par -recommended "MUNIQ" supplements \par -Weight loss, exercise, and diet control were discussed and are highly encouraged. Treatment options were given such as, aqua therapy, and contacting a nutritionist. Recommended to use the elliptical, stationary bike, less use of treadmill. Mindful eating was explained to the patient Obesity is associated with worsening asthma, shortness of breath, and potential for cardiac disease, diabetes, and other underlying medical conditions\par \par problem 8: health maintenance \par -recommended yearly flu shot after October 15\par -recommended strep pneumonia vaccines: Prevnar-13 vaccine, followed by Pneumo vaccine 23 one year following after 65\par -recommended early intervention for Upper Respiratory Infections (URIs)\par -recommended regular osteoporosis evaluations\par -recommended early dermatological evaluations\par -recommended after the age of 50 to the age of 70, colonoscopy every 5 years\par \par F/U in 6-8 weeks.\par He is encouraged to call with any changes, concerns, or questions\par

## 2022-04-06 ENCOUNTER — LABORATORY RESULT (OUTPATIENT)
Age: 67
End: 2022-04-06

## 2022-04-06 LAB — 25(OH)D3 SERPL-MCNC: 52.5 NG/ML

## 2022-04-07 ENCOUNTER — NON-APPOINTMENT (OUTPATIENT)
Age: 67
End: 2022-04-07

## 2022-04-07 ENCOUNTER — TRANSCRIPTION ENCOUNTER (OUTPATIENT)
Age: 67
End: 2022-04-07

## 2022-04-07 LAB — 24R-OH-CALCIDIOL SERPL-MCNC: 56.2 PG/ML

## 2022-04-08 LAB — STRONGYLOIDES AB SER IA-ACNC: NEGATIVE

## 2022-04-10 LAB
A ALTERNATA IGE QN: <0.1 KUA/L
A FUMIGATUS IGE QN: <0.1 KUA/L
C ALBICANS IGE QN: 0.17 KUA/L
C HERBARUM IGE QN: <0.1 KUA/L
CAT DANDER IGE QN: <0.1 KUA/L
COMMON RAGWEED IGE QN: <0.1 KUA/L
D FARINAE IGE QN: 0.1 KUA/L
D PTERONYSS IGE QN: 0.12 KUA/L
DEPRECATED A ALTERNATA IGE RAST QL: 0
DEPRECATED A FUMIGATUS IGE RAST QL: 0
DEPRECATED C ALBICANS IGE RAST QL: NORMAL
DEPRECATED C HERBARUM IGE RAST QL: 0
DEPRECATED CAT DANDER IGE RAST QL: 0
DEPRECATED COMMON RAGWEED IGE RAST QL: 0
DEPRECATED D FARINAE IGE RAST QL: NORMAL
DEPRECATED D PTERONYSS IGE RAST QL: NORMAL
DEPRECATED DOG DANDER IGE RAST QL: 0
DEPRECATED GOOSE FEATHER IGE RAST QL: 0
DEPRECATED M RACEMOSUS IGE RAST QL: 0
DEPRECATED ROACH IGE RAST QL: NORMAL
DEPRECATED TIMOTHY IGE RAST QL: 0
DEPRECATED WHITE OAK IGE RAST QL: NORMAL
DOG DANDER IGE QN: <0.1 KUA/L
GOOSE FEATHER IGE QN: <0.1 KUA/L
M RACEMOSUS IGE QN: <0.1 KUA/L
ROACH IGE QN: 0.12 KUA/L
TIMOTHY IGE QN: <0.1 KUA/L
TOTAL IGE SMQN RAST: 213 KU/L
WHITE OAK IGE QN: 0.16 KUA/L

## 2022-04-11 ENCOUNTER — NON-APPOINTMENT (OUTPATIENT)
Age: 67
End: 2022-04-11

## 2022-04-13 ENCOUNTER — NON-APPOINTMENT (OUTPATIENT)
Age: 67
End: 2022-04-13

## 2022-04-13 ENCOUNTER — APPOINTMENT (OUTPATIENT)
Dept: CARDIOLOGY | Facility: CLINIC | Age: 67
End: 2022-04-13
Payer: MEDICARE

## 2022-04-13 VITALS
DIASTOLIC BLOOD PRESSURE: 88 MMHG | OXYGEN SATURATION: 99 % | WEIGHT: 152.34 LBS | HEART RATE: 65 BPM | SYSTOLIC BLOOD PRESSURE: 130 MMHG | BODY MASS INDEX: 23.91 KG/M2 | HEIGHT: 67 IN

## 2022-04-13 DIAGNOSIS — Z01.818 ENCOUNTER FOR OTHER PREPROCEDURAL EXAMINATION: ICD-10-CM

## 2022-04-13 DIAGNOSIS — I20.9 ANGINA PECTORIS, UNSPECIFIED: ICD-10-CM

## 2022-04-13 LAB
CLAM IGE QN: <0.1 KUA/L
CODFISH IGE QN: <0.1 KUA/L
CORN IGE QN: <0.1 KUA/L
COW MILK IGE QN: <0.1 KUA/L
DEPRECATED CLAM IGE RAST QL: 0
DEPRECATED CODFISH IGE RAST QL: 0
DEPRECATED CORN IGE RAST QL: 0
DEPRECATED COW MILK IGE RAST QL: 0
DEPRECATED DUCK FEATHER IGE RAST QL: 0
DEPRECATED EGG WHITE IGE RAST QL: 2
DEPRECATED PEANUT IGE RAST QL: NORMAL
DEPRECATED SCALLOP IGE RAST QL: <0.1 KUA/L
DEPRECATED SESAME SEED IGE RAST QL: NORMAL
DEPRECATED SHRIMP IGE RAST QL: 0
DEPRECATED SOYBEAN IGE RAST QL: 0
DEPRECATED WALNUT IGE RAST QL: 0
DEPRECATED WHEAT IGE RAST QL: 0
DUCK FEATHER IGE QN: <0.1 KUA/L
EGG WHITE IGE QN: 0.95 KUA/L
PEANUT IGE QN: 0.15 KUA/L
SCALLOP IGE QN: 0
SCALLOP IGE QN: <0.1 KUA/L
SESAME SEED IGE QN: 0.11 KUA/L
SOYBEAN IGE QN: <0.1 KUA/L
WALNUT IGE QN: <0.1 KUA/L
WHEAT IGE QN: <0.1 KUA/L

## 2022-04-13 PROCEDURE — 99215 OFFICE O/P EST HI 40 MIN: CPT

## 2022-04-13 PROCEDURE — 93000 ELECTROCARDIOGRAM COMPLETE: CPT

## 2022-04-13 RX ORDER — KETOCONAZOLE 20 MG/G
2 CREAM TOPICAL
Qty: 1 | Refills: 2 | Status: DISCONTINUED | COMMUNITY
Start: 2020-08-18 | End: 2022-04-13

## 2022-04-13 NOTE — HISTORY OF PRESENT ILLNESS
[FreeTextEntry1] : Kenny Sánchez is a 66-year-old man with a history of Slime-Parkinson-White status post remote ablation, Mccoy's esophagus, premature atrial complexes, and cervical spine disease who returns for cardiac examination because of palpitations and chest pressure  - I last saw him nearly 3 years ago  (July 2019).  He has been under the care of Dr. Wayne Lozoya and Dr Morris Norton at [facility].  \par \par He tells me that he has not felt well since mid February 2022 with daily palpitations and frequent episodes of chest pain. He describes a substernal chest pressure that occurs with exercise (specifically when HR is >80 bpm) that radiates towards his left shoulder and is relieved with rest; these symptoms have limited his ability to exercise and he has been much more sedentary.  He has not experienced any chest discomfort at rest and feels well today.  He has no personal history of coronary artery disease but describes a family history of CAD (mother and grandfather -- although not premature).  He describes frequent episodes of mild palpitations - unable to identify any clear exacerbating or alleviating factors; no associated syncope or presyncope. Palpitations do not seem to be temporally related to chest discomfort.\par \par \par

## 2022-04-13 NOTE — PHYSICAL EXAM
[No Acute Distress] : no acute distress [Normal S1, S2] : normal S1, S2 [No Murmur] : no murmur [Clear Lung Fields] : clear lung fields [Soft] : abdomen soft [Non Tender] : non-tender [Normal Gait] : normal gait [No Edema] : no edema [No Rash] : no rash [Moves all extremities] : moves all extremities [Alert and Oriented] : alert and oriented [de-identified] : Appears his stated age and well [de-identified] : Extraocular muscles intact [de-identified] : Wearing a face mask [de-identified] : No JVD [de-identified] : Bradycardic

## 2022-04-13 NOTE — REASON FOR VISIT
[Arrhythmia/ECG Abnorrmalities] : arrhythmia/ECG abnormalities [Symptom and Test Evaluation] : symptom and test evaluation

## 2022-04-13 NOTE — DISCUSSION/SUMMARY
[With Me] : with me [___ Month(s)] : in [unfilled] month(s) [FreeTextEntry1] : \par Angina pectoris: Mr. Sánchez describes typical angina in the absence of previously diagnosed coronary artery disease.  We discussed potential diagnostic tests, including stress testing, CT coronary, and cardiac catheterization -- since his symptoms are very suggestive of obstructive coronary artery disease I recommend coronary angiography and he agreed.  I recommend initiation of aspirin and continued use of atorvastatin.  I told him that if symptoms worsen or if he were to experienced chest discomfort at rest that he should seek immediate care in an emergency room setting.\par \par Palpitations: Today's ECG demonstrates sinus rhythm; in addition to coronary angiography (as described above) I recommend ambulatory ECG monitoring and echocardiography to further evaluate this symptom.

## 2022-04-13 NOTE — REVIEW OF SYSTEMS
[Weight Gain (___ Lbs)] : no recent weight gain [Weight Loss (___ Lbs)] : no recent weight loss [Chest Discomfort] : chest discomfort [Palpitations] : palpitations [Negative] : Heme/Lymph

## 2022-04-16 LAB
A ALTERNATA IGE QN: <0.1 KUA/L
A FUMIGATUS IGE QN: <0.1 KUA/L
BERMUDA GRASS IGE QN: <0.1 KUA/L
BOXELDER IGE QN: <0.1 KUA/L
C HERBARUM IGE QN: <0.1 KUA/L
CALIF WALNUT IGE QN: <0.1 KUA/L
CAT DANDER IGE QN: <0.1 KUA/L
CMN PIGWEED IGE QN: <0.1 KUA/L
COMMON RAGWEED IGE QN: <0.1 KUA/L
COTTONWOOD IGE QN: <0.1 KUA/L
D FARINAE IGE QN: 0.1 KUA/L
D PTERONYSS IGE QN: 0.12 KUA/L
DEPRECATED A ALTERNATA IGE RAST QL: 0
DEPRECATED A FUMIGATUS IGE RAST QL: 0
DEPRECATED BERMUDA GRASS IGE RAST QL: 0
DEPRECATED BOXELDER IGE RAST QL: 0
DEPRECATED C HERBARUM IGE RAST QL: 0
DEPRECATED CAT DANDER IGE RAST QL: 0
DEPRECATED COMMON PIGWEED IGE RAST QL: 0
DEPRECATED COMMON RAGWEED IGE RAST QL: 0
DEPRECATED COTTONWOOD IGE RAST QL: 0
DEPRECATED D FARINAE IGE RAST QL: NORMAL
DEPRECATED D PTERONYSS IGE RAST QL: NORMAL
DEPRECATED DOG DANDER IGE RAST QL: 0
DEPRECATED GOOSEFOOT IGE RAST QL: NORMAL
DEPRECATED LONDON PLANE IGE RAST QL: 0
DEPRECATED MOUSE URINE PROT IGE RAST QL: 0
DEPRECATED MUGWORT IGE RAST QL: 0
DEPRECATED P NOTATUM IGE RAST QL: 0
DEPRECATED RED CEDAR IGE RAST QL: 0
DEPRECATED ROACH IGE RAST QL: NORMAL
DEPRECATED SHEEP SORREL IGE RAST QL: 0
DEPRECATED SILVER BIRCH IGE RAST QL: NORMAL
DEPRECATED TIMOTHY IGE RAST QL: 0
DEPRECATED WHITE ASH IGE RAST QL: 0
DEPRECATED WHITE OAK IGE RAST QL: NORMAL
DOG DANDER IGE QN: <0.1 KUA/L
GOOSEFOOT IGE QN: 0.12 KUA/L
LONDON PLANE IGE QN: <0.1 KUA/L
MOUSE URINE PROT IGE QN: <0.1 KUA/L
MUGWORT IGE QN: <0.1 KUA/L
MULBERRY (T70) CLASS: 0
MULBERRY (T70) CONC: <0.1 KUA/L
P NOTATUM IGE QN: <0.1 KUA/L
RED CEDAR IGE QN: <0.1 KUA/L
ROACH IGE QN: 0.12 KUA/L
SHEEP SORREL IGE QN: <0.1 KUA/L
SILVER BIRCH IGE QN: 0.15 KUA/L
TIMOTHY IGE QN: <0.1 KUA/L
TREE ALLERG MIX1 IGE QL: 0
WHITE ASH IGE QN: <0.1 KUA/L
WHITE ELM IGE QN: 0.15 KUA/L
WHITE ELM IGE QN: NORMAL
WHITE OAK IGE QN: 0.16 KUA/L

## 2022-04-25 ENCOUNTER — APPOINTMENT (OUTPATIENT)
Dept: CARDIOLOGY | Facility: CLINIC | Age: 67
End: 2022-04-25
Payer: MEDICARE

## 2022-04-25 PROCEDURE — 93306 TTE W/DOPPLER COMPLETE: CPT

## 2022-04-29 LAB
ANION GAP SERPL CALC-SCNC: 13 MMOL/L
APTT BLD: 30.4 SEC
BASOPHILS # BLD AUTO: 0.08 K/UL
BASOPHILS NFR BLD AUTO: 1.2 %
BUN SERPL-MCNC: 19 MG/DL
CALCIUM SERPL-MCNC: 10.2 MG/DL
CHLORIDE SERPL-SCNC: 101 MMOL/L
CO2 SERPL-SCNC: 26 MMOL/L
CREAT SERPL-MCNC: 0.84 MG/DL
EGFR: 97 ML/MIN/1.73M2
EOSINOPHIL # BLD AUTO: 0.8 K/UL
EOSINOPHIL NFR BLD AUTO: 11.6 %
GLUCOSE SERPL-MCNC: 104 MG/DL
HCT VFR BLD CALC: 48.6 %
HGB BLD-MCNC: 15.6 G/DL
IMM GRANULOCYTES NFR BLD AUTO: 0.1 %
INR PPP: 0.96 RATIO
LYMPHOCYTES # BLD AUTO: 2.13 K/UL
LYMPHOCYTES NFR BLD AUTO: 31 %
MAN DIFF?: NORMAL
MCHC RBC-ENTMCNC: 29.8 PG
MCHC RBC-ENTMCNC: 32.1 GM/DL
MCV RBC AUTO: 92.7 FL
MONOCYTES # BLD AUTO: 0.63 K/UL
MONOCYTES NFR BLD AUTO: 9.2 %
NEUTROPHILS # BLD AUTO: 3.23 K/UL
NEUTROPHILS NFR BLD AUTO: 46.9 %
PLATELET # BLD AUTO: 375 K/UL
POTASSIUM SERPL-SCNC: 5.7 MMOL/L
PT BLD: 11.1 SEC
RBC # BLD: 5.24 M/UL
RBC # FLD: 13.4 %
SODIUM SERPL-SCNC: 140 MMOL/L
WBC # FLD AUTO: 6.88 K/UL

## 2022-05-03 ENCOUNTER — FORM ENCOUNTER (OUTPATIENT)
Age: 67
End: 2022-05-03

## 2022-05-03 LAB — SARS-COV-2 N GENE NPH QL NAA+PROBE: NOT DETECTED

## 2022-05-03 NOTE — CHART NOTE - NSCHARTNOTEFT_GEN_A_CORE
Preop Phone Call:  - Able to Reach Patient:  yes  - Info given to: Kenny Lunarone patient having cardiac catheterization  -  and allergies confirmed: yes  - Medication Information Given: yes  - Medications To Take ASA 81mg Ok to take a.m. meds w/sip of water  - Medications To Hold   - Arrival Time: 0945  - NPO after: midnight 	  - Understanding of Information Verbalized: yes  will have a  over the age of 18  for d/c home

## 2022-05-03 NOTE — H&P ADULT - NSHPREVIEWOFSYSTEMS_GEN_ALL_CORE
CONSTITUTIONAL: No fever, weight loss, or fatigue  EYES: No eye pain, visual disturbances, or discharge  ENMT:  No difficulty hearing, tinnitus, vertigo; No sinus or throat pain  NECK: No pain or stiffness  BREASTS: No pain, masses, or nipple discharge  RESPIRATORY: No cough, wheezing, chills or hemoptysis; No shortness of breath  CARDIOVASCULAR:  +chest pain, +palpitations,   GASTROINTESTINAL: No abdominal or epigastric pain. No nausea, vomiting, or hematemesis; No diarrhea or constipation. No melena or hematochezia.  GENITOURINARY: No dysuria, frequency, hematuria, or incontinence  NEUROLOGICAL: No headaches, memory loss, loss of strength, numbness, or tremors  SKIN: No itching, burning, rashes, or lesions   ENDOCRINE: No heat or cold intolerance; No hair loss  MUSCULOSKELETAL: No joint pain or swelling; No muscle, back, or extremity pain  PSYCHIATRIC: No depression, anxiety, mood swings, or difficulty sleeping

## 2022-05-03 NOTE — H&P ADULT - PROBLEM SELECTOR PLAN 1
CP and palpitations x weeks   family hx of CAD   -plan for cardiac cath for ischemic work up   -Consent obtained for cardiac catheterization w/ coronary angiogram and possible stent placement. Pt is competent, has capacity, and understands risks and benefits of procedure. Risks and benefits discussed. Risk discussed included, but not limited to MI, stroke, mortality, major bleeding, arrythmia, or infection. All questions answered

## 2022-05-03 NOTE — H&P ADULT - NSICDXFAMILYHX_GEN_ALL_CORE_FT
FAMILY HISTORY:  Mother  Still living? Unknown  FH: CAD (coronary artery disease), Age at diagnosis: Age Unknown    Grandparent  Still living? Unknown  FH: CAD (coronary artery disease), Age at diagnosis: Age Unknown

## 2022-05-03 NOTE — H&P ADULT - ASSESSMENT
66yr old male PMH WPW, s/p abalation, PAC, Mccoy's esophagus with c/o mild palpitations and substernal chest pain during exercise that radiates towards his left shoulder.  Referred for cardiac cath to further evaluate for ischemia.     ASA class: II  Creatinine: 0.84  GFR: 94  Bleeding  Risk score: 1.6%  Doug Score: 1 pt

## 2022-05-03 NOTE — H&P ADULT - HISTORY OF PRESENT ILLNESS
66yr old male PMH WPW, s/p abalation, PAC, Mccoy's esophagus with c/o mild palpitations and substernal chest pain during exercise that radiates towards his left shoulder. Echo revealed mild moderate regurgitation, normal left ventricular systolic function, normal diastolic function normal tricuspid valve, mild tricuspid regurgitation  66yr old male PMH WPW, s/p abalation, PAC, Mccoy's esophagus with c/o mild palpitations and substernal chest pain during exercise that radiates towards his left shoulder. Echo revealed mild moderate regurgitation, normal left ventricular systolic function, normal diastolic function normal tricuspid valve, mild tricuspid regurgitation. Mother with history of CAD. Referred for cardiac cath to further evaluate for ischemia.

## 2022-05-03 NOTE — H&P ADULT - NSICDXPASTMEDICALHX_GEN_ALL_CORE_FT
PAST MEDICAL HISTORY:  Mccoy esophagus     GERD (gastroesophageal reflux disease)     Hiatal hernia     History of loop recorder     History of Slime-Parkinson-White (WPW) syndrome

## 2022-05-03 NOTE — H&P ADULT - NSHPLABSRESULTS_GEN_ALL_CORE
Echo revealed mild moderate regurgitation, normal left ventricular systolic function, normal diastolic function normal tricuspid valve, mild tricuspid regurgitation 4/2022 Echo   normal left ventricular systolic function, normal diastolic function     4/13/22 EKG SB rate 53bpm

## 2022-05-04 ENCOUNTER — OUTPATIENT (OUTPATIENT)
Dept: OUTPATIENT SERVICES | Facility: HOSPITAL | Age: 67
LOS: 1 days | Discharge: HOME | End: 2022-05-04
Payer: MEDICARE

## 2022-05-04 VITALS
SYSTOLIC BLOOD PRESSURE: 130 MMHG | OXYGEN SATURATION: 98 % | DIASTOLIC BLOOD PRESSURE: 80 MMHG | RESPIRATION RATE: 16 BRPM | HEART RATE: 64 BPM

## 2022-05-04 VITALS
HEART RATE: 74 BPM | DIASTOLIC BLOOD PRESSURE: 81 MMHG | WEIGHT: 147.93 LBS | TEMPERATURE: 98 F | RESPIRATION RATE: 16 BRPM | OXYGEN SATURATION: 98 % | SYSTOLIC BLOOD PRESSURE: 153 MMHG | HEIGHT: 67 IN

## 2022-05-04 DIAGNOSIS — I20.9 ANGINA PECTORIS, UNSPECIFIED: ICD-10-CM

## 2022-05-04 DIAGNOSIS — Z98.890 OTHER SPECIFIED POSTPROCEDURAL STATES: Chronic | ICD-10-CM

## 2022-05-04 PROCEDURE — C1769: CPT

## 2022-05-04 PROCEDURE — 99152 MOD SED SAME PHYS/QHP 5/>YRS: CPT

## 2022-05-04 PROCEDURE — 93458 L HRT ARTERY/VENTRICLE ANGIO: CPT

## 2022-05-04 PROCEDURE — C1894: CPT

## 2022-05-04 PROCEDURE — C1887: CPT

## 2022-05-04 PROCEDURE — 99153 MOD SED SAME PHYS/QHP EA: CPT

## 2022-05-04 PROCEDURE — 93458 L HRT ARTERY/VENTRICLE ANGIO: CPT | Mod: 26

## 2022-05-04 RX ORDER — FAMOTIDINE 10 MG/ML
1 INJECTION INTRAVENOUS
Qty: 0 | Refills: 0 | DISCHARGE

## 2022-05-04 RX ORDER — BUDESONIDE, GLYCOPYRROLATE, AND FORMOTEROL FUMARATE 160; 9; 4.8 UG/1; UG/1; UG/1
2 AEROSOL, METERED RESPIRATORY (INHALATION)
Qty: 0 | Refills: 0 | DISCHARGE

## 2022-05-04 RX ORDER — ATORVASTATIN CALCIUM 80 MG/1
1 TABLET, FILM COATED ORAL
Qty: 0 | Refills: 0 | DISCHARGE

## 2022-05-04 RX ORDER — ASPIRIN/CALCIUM CARB/MAGNESIUM 324 MG
1 TABLET ORAL
Qty: 0 | Refills: 0 | DISCHARGE

## 2022-05-04 RX ORDER — SODIUM CHLORIDE 9 MG/ML
1000 INJECTION INTRAMUSCULAR; INTRAVENOUS; SUBCUTANEOUS
Refills: 0 | Status: DISCONTINUED | OUTPATIENT
Start: 2022-05-04 | End: 2022-05-04

## 2022-05-04 RX ORDER — OMEPRAZOLE 10 MG/1
1 CAPSULE, DELAYED RELEASE ORAL
Qty: 0 | Refills: 0 | DISCHARGE

## 2022-05-04 RX ADMIN — SODIUM CHLORIDE 134 MILLILITER(S): 9 INJECTION INTRAMUSCULAR; INTRAVENOUS; SUBCUTANEOUS at 14:29

## 2022-05-04 RX ADMIN — SODIUM CHLORIDE 250 MILLILITER(S): 9 INJECTION INTRAMUSCULAR; INTRAVENOUS; SUBCUTANEOUS at 10:32

## 2022-05-04 NOTE — PROGRESS NOTE ADULT - SUBJECTIVE AND OBJECTIVE BOX
Nurse Practitioner Progress note:     HPI:  66yr old male PMH WPW, s/p ablation PAC, Mccoy's esophagus with c/o mild palpitations and substernal chest pain during exercise that radiates towards his left shoulder. Echo revealed mild moderate regurgitation, normal left ventricular systolic function, normal diastolic function normal tricuspid valve, mild tricuspid regurgitation. Mother with history of CAD. Referred for cardiac cath to further evaluate for ischemia.  (03 May 2022 16:22)          Vital Signs  T(C): 36.7 (05-04-22 @ 10:30), Max: 36.7 (05-04-22 @ 10:20)  HR: 74 (05-04-22 @ 10:30) (74 - 74)  BP: 153/81 (05-04-22 @ 10:30) (153/81 - 153/81)  RR: 16 (05-04-22 @ 10:30) (16 - 16)  SpO2: 98% (05-04-22 @ 10:30) (98% - 98%)  Wt(kg): --        PHYSICAL EXAM:  NEURO: Non-focal, AxOx3.  No neuro deficits   CHEST/LUNG: Clear to auscultation bilaterally; No wheeze  HEART: s1 s2 Regular rate and rhythm; No murmurs, rubs, or gallops  ABDOMEN: Soft, Nontender, Nondistended; Bowel sounds present X 4 quadrants   EXTREMITIES:  2+ Peripheral Pulses, No clubbing, cyanosis, or edema   VASCULAR: Peripheral pulses palpable 2+ bilaterally  PROCEDURE SITE:  RRA accessed, hemoband to be removed 1 hour post placement.  Site is without hematoma or bleeding. Sensation and JASON intact. Distal pulses palpable 2+, capillary refill < 2 seconds. Patient denies pain, numbness, tingling, CP or SOB. Clean dry dressing applied     PROCEDURE RESULTS: full report to follow     ASSESSMENT: 66yr old male PMH WPW, s/p ablation PAC, Mccoy's esophagus with c/o mild palpitations and substernal chest pain during exercise that radiates towards his left shoulder.     s/p LHC revealing non obstructive disease     PLAN:  -VS, diet, activity as per post cath orders  - IVF per DONOVAN protocol   -Encourage PO fluids  -Continue current medications  -Post cath instructions reviewed, patient verbalizes and understands instructions  -Discussed therapeutic lifestyle changes to reduce risk factors such as following a cardiac diet, weight loss, maintaining a healthy weight, exercise, smoking cessation, medication compliance, and regular follow-up  with PCP/Cardioloigst  - Patient to be discharged home, recommend following up with Tyrone Lee 1- 2 weeks for further testing   -Plan of care discussed with patient, RN and Dr. Cobb

## 2022-05-04 NOTE — ASU PATIENT PROFILE, ADULT - FALL HARM RISK - UNIVERSAL INTERVENTIONS
Bed in lowest position, wheels locked, appropriate side rails in place/Call bell, personal items and telephone in reach/Instruct patient to call for assistance before getting out of bed or chair/Non-slip footwear when patient is out of bed/[place] to call system/Physically safe environment - no spills, clutter or unnecessary equipment/Purposeful Proactive Rounding/Room/bathroom lighting operational, light cord in reach

## 2022-05-04 NOTE — PACU DISCHARGE NOTE - COMMENTS
Pt received instructions on medications, activity, follow up with Dr. Lyons and signs and symptoms to report if bleeding occurs verbalizes understanding with written instructions provided as well.  Zay removed and site is clear.  Right radial bulky dressing intact with fingers warm and mobile with good capillary refill. Pt denies pain or discomfort.  Left via wheelchair without any complaints.

## 2022-05-05 DIAGNOSIS — I20.0 UNSTABLE ANGINA: ICD-10-CM

## 2022-05-06 PROBLEM — Z86.79 PERSONAL HISTORY OF OTHER DISEASES OF THE CIRCULATORY SYSTEM: Chronic | Status: ACTIVE | Noted: 2022-05-04

## 2022-05-06 PROBLEM — K22.70 BARRETT'S ESOPHAGUS WITHOUT DYSPLASIA: Chronic | Status: ACTIVE | Noted: 2022-05-04

## 2022-05-06 PROBLEM — Z98.890 OTHER SPECIFIED POSTPROCEDURAL STATES: Chronic | Status: ACTIVE | Noted: 2022-05-04

## 2022-05-27 ENCOUNTER — APPOINTMENT (OUTPATIENT)
Dept: CARDIOLOGY | Facility: CLINIC | Age: 67
End: 2022-05-27
Payer: MEDICARE

## 2022-05-27 VITALS
OXYGEN SATURATION: 97 % | HEART RATE: 71 BPM | DIASTOLIC BLOOD PRESSURE: 84 MMHG | HEIGHT: 67 IN | BODY MASS INDEX: 24.01 KG/M2 | WEIGHT: 153 LBS | SYSTOLIC BLOOD PRESSURE: 136 MMHG

## 2022-05-27 DIAGNOSIS — R07.89 OTHER CHEST PAIN: ICD-10-CM

## 2022-05-27 DIAGNOSIS — R00.2 PALPITATIONS: ICD-10-CM

## 2022-05-27 DIAGNOSIS — I45.6 PRE-EXCITATION SYNDROME: ICD-10-CM

## 2022-05-27 DIAGNOSIS — E78.5 HYPERLIPIDEMIA, UNSPECIFIED: ICD-10-CM

## 2022-05-27 PROCEDURE — 99214 OFFICE O/P EST MOD 30 MIN: CPT

## 2022-05-27 RX ORDER — LATANOPROST/PF 0.005 %
0.01 DROPS OPHTHALMIC (EYE)
Refills: 0 | Status: ACTIVE | COMMUNITY

## 2022-05-27 NOTE — REVIEW OF SYSTEMS
[Chest Discomfort] : chest discomfort [Palpitations] : palpitations [Negative] : Heme/Lymph [Feeling Fatigued] : feeling fatigued [Weight Gain (___ Lbs)] : no recent weight gain [Weight Loss (___ Lbs)] : no recent weight loss

## 2022-05-27 NOTE — DISCUSSION/SUMMARY
[With Me] : with me [___ Month(s)] : in [unfilled] month(s) [FreeTextEntry1] : \par Chest discomfort: Unclear etiology -  coronary angiography with normal findings;  consider GI evaluation.\par \par Palpitations: Persistent;  a 7-day ambulatory ECG monitor was applied following today's encounter -  I will contact him by telephone to discuss results.\par \par Mitral valve regurgitation: Mild to moderate in severity;  observation recommended.\par \par Hypercholesterolemia: Satisfactory control;  continue atorvastatin.\par \par   History of WPW: Status post remote ablation\par \par

## 2022-05-27 NOTE — HISTORY OF PRESENT ILLNESS
[FreeTextEntry1] : Kenny Sánchez is a 66-year-old man with a history of Slime-Parkinson-White status post remote ablation, Mccoy's esophagus, premature atrial complexes, and cervical spine disease who returns for cardiac examination.  During our last encounter in April he described chest pressure that was consistent with angina pectoris and caused him to stop exercising.  I referred him for coronary angiography and there was no significant disease.  Recent sleep study revealed mild MARIA DEL ROSARIO. He is concerned about his elevated eosinophile  level and persistent symptoms of  epigastric/ chest discomfort -  plans to pursue GI consultation.   He has resumed  some exercise but feels deconditioned.

## 2022-05-27 NOTE — PHYSICAL EXAM
[No Acute Distress] : no acute distress [Normal S1, S2] : normal S1, S2 [No Murmur] : no murmur [Clear Lung Fields] : clear lung fields [Normal Gait] : normal gait [No Edema] : no edema [Alert and Oriented] : alert and oriented [de-identified] : Appears his stated age, normal BMI [de-identified] : Wearing a face mask [de-identified] : No JVD

## 2022-05-27 NOTE — CARDIOLOGY SUMMARY
[de-identified] : 4/13/22.  Sinus  Bradycardia  [de-identified] : 4/25/2022.  Normal left ventricular size and function with estimated ejection fraction 69%.  Mild to moderate mitral regurgitation.  Aortic valve sclerosis with minimal regurgitation.  Mild tricuspid regurgitation.  Normal estimated pulmonary artery pressure. [de-identified] : 5/4/2022.  Normal coronary arteries.  LVEF 60%

## 2022-06-05 DIAGNOSIS — I47.1 SUPRAVENTRICULAR TACHYCARDIA: ICD-10-CM

## 2022-06-21 PROBLEM — I47.1 PAROXYSMAL SVT (SUPRAVENTRICULAR TACHYCARDIA): Status: ACTIVE | Noted: 2022-06-21

## 2022-06-21 RX ORDER — METOPROLOL SUCCINATE 25 MG/1
25 TABLET, EXTENDED RELEASE ORAL DAILY
Qty: 30 | Refills: 1 | Status: ACTIVE | COMMUNITY
Start: 2022-06-21 | End: 1900-01-01

## 2022-07-06 ENCOUNTER — APPOINTMENT (OUTPATIENT)
Dept: PULMONOLOGY | Facility: CLINIC | Age: 67
End: 2022-07-06

## 2022-07-06 ENCOUNTER — NON-APPOINTMENT (OUTPATIENT)
Age: 67
End: 2022-07-06

## 2022-07-06 VITALS
WEIGHT: 148 LBS | BODY MASS INDEX: 23.23 KG/M2 | SYSTOLIC BLOOD PRESSURE: 144 MMHG | HEART RATE: 46 BPM | HEIGHT: 67 IN | OXYGEN SATURATION: 97 % | TEMPERATURE: 96.8 F | RESPIRATION RATE: 16 BRPM | DIASTOLIC BLOOD PRESSURE: 82 MMHG

## 2022-07-06 DIAGNOSIS — G47.33 OBSTRUCTIVE SLEEP APNEA (ADULT) (PEDIATRIC): ICD-10-CM

## 2022-07-06 DIAGNOSIS — K21.9 GASTRO-ESOPHAGEAL REFLUX DISEASE W/OUT ESOPHAGITIS: ICD-10-CM

## 2022-07-06 DIAGNOSIS — R06.02 SHORTNESS OF BREATH: ICD-10-CM

## 2022-07-06 DIAGNOSIS — J45.991 COUGH VARIANT ASTHMA: ICD-10-CM

## 2022-07-06 DIAGNOSIS — Z72.820 SLEEP DEPRIVATION: ICD-10-CM

## 2022-07-06 DIAGNOSIS — J82.83 EOSINOPHILIC ASTHMA: ICD-10-CM

## 2022-07-06 PROCEDURE — 99214 OFFICE O/P EST MOD 30 MIN: CPT | Mod: 25

## 2022-07-06 PROCEDURE — 94010 BREATHING CAPACITY TEST: CPT

## 2022-07-06 PROCEDURE — 95012 NITRIC OXIDE EXP GAS DETER: CPT

## 2022-07-06 RX ORDER — AZELASTINE HYDROCHLORIDE 137 UG/1
137 SPRAY, METERED NASAL
Qty: 30 | Refills: 0 | Status: ACTIVE | COMMUNITY
Start: 2022-06-17

## 2022-07-06 RX ORDER — AMOXICILLIN AND CLAVULANATE POTASSIUM 875; 125 MG/1; MG/1
875-125 TABLET, COATED ORAL
Qty: 20 | Refills: 0 | Status: DISCONTINUED | COMMUNITY
Start: 2022-01-10

## 2022-07-06 RX ORDER — MELOXICAM 15 MG/1
15 TABLET ORAL
Qty: 30 | Refills: 0 | Status: ACTIVE | COMMUNITY
Start: 2022-03-01

## 2022-07-06 RX ORDER — FAMOTIDINE 40 MG/1
40 TABLET, FILM COATED ORAL
Qty: 30 | Refills: 0 | Status: ACTIVE | COMMUNITY
Start: 2022-06-17

## 2022-07-06 NOTE — HISTORY OF PRESENT ILLNESS
[Former] : former [TextBox_4] : Mr. MIGUEL  is a 66 year old male with a history of Seasonal allergies (Hay fever), Slime-Parkinson-White status post remote ablation, LBBB (transitional/rate related), Mccoy's esophagus, premature atrial complexes, and cervical spine disease. who now comes to the office for a follow up pulmonary evaluation. His chief complaint is\par -he notes that he is waiting for pathology on endoscopy (Dr. Blair Avelar)\par -he notes having a cardiac catheterization done \par -he notes a feeling in his chest at times\par -he notes some heartburn\par -he notes that he is having trouble swallowing pills so he opens them up instead of swallowing them\par -he notes snoring \par -he notes some palpitations\par \par \par -patient denies any headaches, nausea, vomiting, fever, chills, sweats coughing, wheezing, fatigue, diarrhea, constipation, myalgias, dizziness, leg swelling, leg pain, itchy eyes, itchy ears, reflux or sour taste in the mouth  [TextBox_11] : 0.5 [TextBox_13] : 5 [YearQuit] : 1980

## 2022-07-06 NOTE — REASON FOR VISIT
[Follow-Up] : a follow-up visit [Shortness of Breath] : shortness of breath [TextBox_44] : SOB, chest pressure/ tightness, poor sleep, asthma, Eosinophilic asthma, allergy, GERD, +MARIA DEL ROSARIO (mixed)

## 2022-07-06 NOTE — PHYSICAL EXAM
[No Acute Distress] : no acute distress [Well Nourished] : well nourished [No Deformities] : no deformities [Well Developed] : well developed [Normal Appearance] : normal appearance [No Neck Mass] : no neck mass [Normal Rate/Rhythm] : normal rate/rhythm [Normal S1, S2] : normal s1, s2 [No Murmurs] : no murmurs [No Resp Distress] : no resp distress [Clear to Auscultation Bilaterally] : clear to auscultation bilaterally [No Abnormalities] : no abnormalities [Normal Gait] : normal gait [No Clubbing] : no clubbing [No Cyanosis] : no cyanosis [No Edema] : no edema [FROM] : FROM [Normal Color/ Pigmentation] : normal color/ pigmentation [No Focal Deficits] : no focal deficits [Oriented x3] : oriented x3 [Normal Affect] : normal affect [II] : Mallampati Class: II [TextBox_68] : I:E 1:3; Clear

## 2022-07-06 NOTE — PROCEDURE
[FreeTextEntry1] : Sleep study (4/28/2022) revealed mild obstructive sleep apnea with an AHI/TRINITY of 9 and a low oxygen saturation of 89%\par \par Feno was 26; a normal value being less than 25. Fractional exhaled nitric oxide (FENO) is regarded as a simple, noninvasive method for assessing eosinophilic airway inflammation. Produced by a variety of cells within the lung, nitric oxide (NO) concentrations are generally low in healthy individuals. However, high concentrations of NO appear to be involved in nonspecific host defense mechanisms and chronic inflammatory  diseases such as asthma. The American Thoracic Society (ATS) therefore recommended using FENO to aid in the diagnosis and monitoring of eosinophilic airway inflammation and asthma, and for identifying steroid responsive individuals whose chronic respiratory symptoms may be caused by airway inflammation \par \par PFT revealed normal flows, with a FEV1 of 3.29L, which is 107% of predicted, with a normal flow volume loop

## 2022-07-06 NOTE — ADDENDUM
[FreeTextEntry1] : Documented by Michael Howard acting as a scribe for Dr. Gabriele Gurrola on 07/06/2022.\par \par All medical record entries made by the Scribe were at my, Dr. Gabriele Gurrola's, direction and personally dictated by me on 07/06/2022. I have reviewed the chart and agree that the record accurately reflects my personal performance of the history, physical exam, assessment and plan. I have also personally directed, reviewed, and agree with the discharge instructions.

## 2022-07-06 NOTE — ASSESSMENT
[FreeTextEntry1] : Mr. MIGUEL  is a 66 year old male with a history of Seasonal allergies (Hay fever), Slime-Parkinson-White status post remote ablation, LBBB (transitional/rate related), Mccoy's esophagus, premature atrial complexes, and cervical spine disease. who now comes to the office for a follow up pulmonary evaluation for SOB, chest pressure/ tightness, poor sleep, asthma, Eosinophilic asthma, allergy, GERD, +MARIA DEL ROSARIO (mixed)\par \par His shortness of breath is multifactorial due to:\par -poor mechanics of breathing \par -out of shape / overweight\par -pulmonary disease\par    -asthma\par -?cardiac disease (Dr. Hansen and Dr Norton)\par \par problem 1. asthma \par -complete methacholine challenge to r/o asthma\par -add Breztri 2 inhalations BID \par -Asthma is  believed to be caused by inherited (genetic) and environmental factor, but its exact cause is unknown. Asthma may be triggered by allergens, lung infections, or irritants in the air. Asthma triggers are different for each person \par -Inhaler technique reviewed as well as oral hygiene techniques reviewed with patient. Avoidance of cold air, extremes of temperature, rescue inhaler should be used before exercise. Order of medication reviewed with patient. Recommended use of a cool mist humidifier in the bedroom. 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 \par -s/p strongyloides antibodies  ([place]) - negative\par -candidate of biologic therapy \par -The safety and efficacy of Nucala was established in three double-blind, randomized, placebo-controlled trials in patients with severe asthma. Compared to a placebo, patients with severe asthma receiving Nucala had fewer exacerbation requiring hospitalization and/or emergency department visits, and a longer time to first exacerbation.  In addition, patients with severe asthma receiving Nucala or Fasenra experienced greater reductions in their daily maintenance oral corticosteroid dose, while maintaining asthma control compared with patients receiving placebo. Treatment with Nucala did not result in a significant improvement in lung function, as measured by the volume of air exhaled by patients in one second. The most common side effects include: headache, injection site reactions, back pain, weakness, and fatigue; hypersensitivity reactions can occur within hours or days including swelling of the face, mouth, and tongue, fainting, dizziness, hives, breathing problems, and rash; herpes zoster infections have occurred. The drug is a monoclonal antibody that inhibits interleukin-5 which helps regular eosinophils, a type of white blood cell that contributes to asthma. The over-production of eosinophils can cause inflammation in the lungs, increasing the frequency of asthma attacks. Patients must also take other medications, including high dose inhaled corticosteroids and at least one additional asthma drug. \par \par Problem 1B: Elevated IgE\par -xolair candidate\par -Xolair is a recombinant DNA- derived humanized IgG1K monoclonal antibody that selectively binds ot human immunoglobulin E (IgE). Xolair is produced by a Chinese hamster ovary cell suspension culture in nutrient medium containing the antibiotic gentamicin. Gentamicin is not detectable in the final product. Xolair is a sterile, white, preservative free, lyophilized powder contained in a single use vial that is reconstituted with sterile water for suspension. Side effects include: wheezing, tightness of the chest, trouble breathing, hives, skin rash, feeling anxious or light-headed, fainting, warmth or tingling under skin, or swelling of face, lips, or tongue \par \par problem 2:Allergies\par -no Rx at this time \par -s/p Blood work to include: asthma panel +, food IgE panel +, IgE level (213), eosinophil level +, vitamin D level \par -Environmental measures for allergies were encouraged including mattress and pillow covers, air purifier, and environmental controls. \par \par problem 3:LPR/ GERD ?Eosinophilic esophagitis \par -Add Pepcid 40 mg QHS \par -continue Omeprazole 20 mg QAM, pre-meal \par -recommended f/u with Dr Wei\par -Rule of 2s: avoid eating too much, eating too late, eating too spicy, eating two hours before bed.\par -Things to avoid including overeating, spicy foods, tight clothing, eating within three hours of bed, this list is not all inclusive. \par -For treatment of reflux, possible options discussed including diet control, H2 blockers, PPIs, as well as coating motility agents discussed as treatment options. Timing of meals and proximity of last meal to sleep were discussed. If symptoms persist, a formal gastrointestinal evaluation is needed. \par \par Problem 4: poor sleep +MARIA DEL ROSARIO (mild and mixed)\par -s/p home sleep study \par -dental device recommended\par Sleep apnea is associated with adverse clinical consequences which can affect most organ systems.  Cardiovascular disease risk includes arrhythmias, atrial fibrillation, hypertension, coronary artery disease, and stroke. Metabolic disorders include diabetes type 2, non-alcoholic fatty liver disease. Mood disorder especially depression; and cognitive decline especially in the elderly. Associations with  chronic reflux/Mccoy’s esophagus some but not all inclusive. \par -Reasons  include arousal consistent with hypopnea; respiratory events most prominent in REM sleep or supine position; therefore sleep staging and body position are important for accurate diagnosis and estimation of AHI. \par \par problem 5: cardiac disease\par -recommended to continue to follow up with Cardiologist (Dr. Hansen and Dr Norton)\par \par problem 6: poor breathing mechanics\par -recommended Wim Hof and Buteyko breathing techniques\par -recommended internet exercise platforms: "[employer]" and Aerobic exercise for seniors\par -Proper breathing techniques were reviewed with an emphasis of exhalation. Patient instructed to breath in for 1 second and out for four seconds. Patient was encouraged to not talk while walking. \par \par problem 7: out of shape / overweight\par -recommended "MUNIQ" supplements \par -Weight loss, exercise, and diet control were discussed and are highly encouraged. Treatment options were given such as, aqua therapy, and contacting a nutritionist. Recommended to use the elliptical, stationary bike, less use of treadmill. Mindful eating was explained to the patient Obesity is associated with worsening asthma, shortness of breath, and potential for cardiac disease, diabetes, and other underlying medical conditions\par \par problem 8: health maintenance \par -recommended yearly flu shot after October 15\par -recommended strep pneumonia vaccines: Prevnar-13 vaccine, followed by Pneumo vaccine 23 one year following after 65\par -recommended early intervention for Upper Respiratory Infections (URIs)\par -recommended regular osteoporosis evaluations\par -recommended early dermatological evaluations\par -recommended after the age of 50 to the age of 70, colonoscopy every 5 years\par \par F/U in 6-8 weeks.\par He is encouraged to call with any changes, concerns, or questions\par

## 2022-08-27 ENCOUNTER — RX RENEWAL (OUTPATIENT)
Age: 67
End: 2022-08-27

## 2022-08-27 RX ORDER — LEVALBUTEROL TARTRATE 45 UG/1
45 AEROSOL, METERED ORAL
Qty: 15 | Refills: 1 | Status: ACTIVE | COMMUNITY
Start: 2022-07-06 | End: 1900-01-01

## 2023-04-17 ENCOUNTER — APPOINTMENT (OUTPATIENT)
Dept: DERMATOLOGY | Facility: CLINIC | Age: 68
End: 2023-04-17
Payer: MEDICARE

## 2023-04-17 DIAGNOSIS — Z12.83 ENCOUNTER FOR SCREENING FOR MALIGNANT NEOPLASM OF SKIN: ICD-10-CM

## 2023-04-17 DIAGNOSIS — L82.0 INFLAMED SEBORRHEIC KERATOSIS: ICD-10-CM

## 2023-04-17 DIAGNOSIS — L57.0 ACTINIC KERATOSIS: ICD-10-CM

## 2023-04-17 DIAGNOSIS — L81.4 OTHER MELANIN HYPERPIGMENTATION: ICD-10-CM

## 2023-04-17 DIAGNOSIS — D22.9 MELANOCYTIC NEVI, UNSPECIFIED: ICD-10-CM

## 2023-04-17 PROCEDURE — 17110 DESTRUCTION B9 LES UP TO 14: CPT | Mod: 59

## 2023-04-17 PROCEDURE — 99213 OFFICE O/P EST LOW 20 MIN: CPT | Mod: 25

## 2023-04-17 PROCEDURE — 17000 DESTRUCT PREMALG LESION: CPT | Mod: 59

## 2023-04-17 NOTE — HISTORY OF PRESENT ILLNESS
[FreeTextEntry1] : skin check [de-identified] : 67 year old male here for skin check. itchy spots on right temple and scaly spot on= left ear.

## 2023-04-17 NOTE — ASSESSMENT
[FreeTextEntry1] : 1) benign findings as above- education\par \par 2) Actinic keratoses as above\par -Treated with cryotherapy x 2, side effects discussed including erythema and scarring, blister formation expected by patient, total freeze time 4 seconds. Wound care reviewed withpatient. Risk of hypo/hyperpigmentation reviewed with patient, and possible need for re-treatment and / or future biopsy also reviewed with patient\par - total # treated- 1\par \par 3) ISK\par The specified lesions were treated with liquid nitrogen cryotherapy.  Discussed risks including pain, blistering, crusting, discoloration, recurrence.\par \par xerosis; cerave PM cream\par \par had shingle vaccine; develops pruritus occasionally on left torso; unclear, HC PRN; SED

## 2023-04-17 NOTE — PHYSICAL EXAM
[FreeTextEntry3] : AAOx3, pleasant, NAD, no visual lymphadenopathy\par hair, scalp, face, nose, eyelids, ears, lips, oropharynx, neck, chest, abdomen, back, right arm, left arm, nails, and hands examined with all normal findings,\par pertinent findings include:\par \par multiple benign nevi and lentigines\par scaling erythematous papule on left ear\par + inflamed, waxy, keratotic papule on right temple\par

## 2023-08-20 RX ORDER — AMMONIUM LACTATE 12 %
12 CREAM (GRAM) TOPICAL
Qty: 1 | Refills: 6 | Status: ACTIVE | COMMUNITY
Start: 2020-06-20 | End: 1900-01-01

## 2023-10-09 ENCOUNTER — APPOINTMENT (OUTPATIENT)
Dept: PULMONOLOGY | Facility: CLINIC | Age: 68
End: 2023-10-09

## 2023-12-12 NOTE — H&P ADULT - CARDIOVASCULAR
details… Regular rate & rhythm, normal S1, S2; no murmurs, gallops or rubs; no S3, S4 Quality 226: Preventive Care And Screening: Tobacco Use: Screening And Cessation Intervention: Patient screened for tobacco use and is an ex/non-smoker Detail Level: Detailed Quality 130: Documentation Of Current Medications In The Medical Record: Current Medications Documented Quality 431: Preventive Care And Screening: Unhealthy Alcohol Use - Screening: Patient identified as an unhealthy alcohol user when screened for unhealthy alcohol use using a systematic screening method and received brief counseling Quality 110: Preventive Care And Screening: Influenza Immunization: Influenza Immunization Administered during Influenza season